# Patient Record
Sex: MALE | Race: WHITE
[De-identification: names, ages, dates, MRNs, and addresses within clinical notes are randomized per-mention and may not be internally consistent; named-entity substitution may affect disease eponyms.]

---

## 2019-02-21 ENCOUNTER — HOSPITAL ENCOUNTER (OUTPATIENT)
Dept: HOSPITAL 61 - NM | Age: 74
Discharge: HOME | End: 2019-02-21
Attending: INTERNAL MEDICINE
Payer: MEDICARE

## 2019-02-21 DIAGNOSIS — R06.09: Primary | ICD-10-CM

## 2019-02-21 DIAGNOSIS — I49.3: ICD-10-CM

## 2019-02-21 PROCEDURE — A9500 TC99M SESTAMIBI: HCPCS

## 2019-02-21 PROCEDURE — 96374 THER/PROPH/DIAG INJ IV PUSH: CPT

## 2019-02-21 PROCEDURE — 93017 CV STRESS TEST TRACING ONLY: CPT

## 2019-02-21 PROCEDURE — 78452 HT MUSCLE IMAGE SPECT MULT: CPT

## 2019-02-21 NOTE — RAD
MR#: [id number]

Account#: [id number]

Accession#: [id number]

Date of Study: 02/21/2019

Ordering Physician: REJI IRWIN, 

Referring Physician: BALJEET SEPULVEDA Tech: RT Vik (R) (N)





--------------- APPROVED REPORT --------------





Test Type:          Pharmacological

Stress Nurse/Tech: MANUEL Cummins RN

Test Indications: dyspnea on exertion

Cardiac History: No known cardiac

Medications:     See Electronic Medical Record

Medical History: See Electronic Medical Record

Resting ECG:     SB PVC

Resting Heart Rate: 52 bpm

Resting Blood Pressure: 152/85mmHg

Pretest Chest Pain: None



Nurse/Tech Notes

Lungs CTA, S1S2

Consent: The procedure was explained to the patient in lay terms. Informed consent was witnessed. Karsten
eout was entered into [employer]. History and Stress Test performed by MANUEL Cummins RN



Pharm. Details

Pharmacologic stress testing was performed using 0.4mg per 5ml of regadenoson given intravenously ove
r 7-10 seconds.



Stress Symptoms

No chest pain or symptoms.



POST EXERCISE

Reason for Termination: Infusion complete

Max HR: 88 bpm

Max Blood Pressure: 157/75mmHg

Blood Pressure response to exercise: Normal blood pressure response during stress.

Heart Rate response to exercise: Normal Response

Chest Pain: No. 

Arrhythmia: Yes. see above baseline

ST Change: No. 



INTERPRETATION

Stress EKG Conclusion: The resting EKG shows a sinus rhythm with nonspecific ST segment changes and P
VCs.

The stress EKG shows no significant changes from baseline.

No EKG evidence of stressed induced ischemia.



Imaging Protocol

IMAGE PROTOCOL: Rest Tc-99m/stress Tc-99m 1 day



Rest:            Stress:         Viability:   

Radiopharm.Tc99m NlmnmcuxlLl41z Sestamibi

[id number]            33mCi            

Duration    13min.           13min.           

Img Date  02/21/2019 02/21/2019      

Inj-Img [id number].           60min.           



Rest Admin Site:IV - Left AntecubitalAdministrator:RT Dulce (R)(N)

Stress Admin Site: IV - Left AntecubitalAdministrator: MILLY Bender



STRESS DATA

End Diast. Vol.162.0mlLVEDV index BSA81.0ml

End Syst. Vol.72.0mlLVESV index BSA36.0ml

Myocardial Tvma267.0gEject. Cmodnkxj92.0%



Stress Scores

Regional WT0.00Summed WT9.00

Regional WM0.00Summed WM3.00



LV Perfusion

The stress scans show a small inferior wall defect.

The rest scans show a small inferior wall defect.

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 There is no significant reversible ischemia 
present.



Wall Motion

Left ventricular systolic function shows no regional wall motion abnormalities and an ejection fracti
on of 56%.



LV Perf. Quant

17 Seg. SSS3.00

17 Seg. SRS6.00

17 Seg. SDS0.00

Stress Defect Extent (% LAD)0.00Rest Defect Extent (% LAD)4.40Rev. Defect Extent (% LAD)0.00

Stress Defect Extent (% LCX) 0.00Rest Defect Extent (% LCX)2.50Rev. Defect Extent (% LCX)0.00

Stress Defect Extent (% RCA)24.40Rest Defect Extent (% RCA)31.10Rev. Defect Extent (% RCA)0.00

Stress Defect Extent (% [place])5.40Rest Defect Extent (% [place]NE)10.40Rev. Defect Extent (% [place]NE)0.00



Conclusion

1. No EKG evidence of stressed induced ischemia.

2. Nuclear imaging is positive for a small inferior wall defect.

3. Nuclear imaging shows no reversible ischemia.

4. Ejection fraction is 56% with no regional wall motion abnormalities.

5. Moderate to moderately low risk study.



Signed by : Reji rIwin MD

Electronically Approved : 02/21/2019 14:49:32